# Patient Record
Sex: MALE | Race: BLACK OR AFRICAN AMERICAN | NOT HISPANIC OR LATINO | URBAN - METROPOLITAN AREA
[De-identification: names, ages, dates, MRNs, and addresses within clinical notes are randomized per-mention and may not be internally consistent; named-entity substitution may affect disease eponyms.]

---

## 2017-09-28 ENCOUNTER — OUTPATIENT (OUTPATIENT)
Dept: OUTPATIENT SERVICES | Facility: HOSPITAL | Age: 52
LOS: 1 days | Discharge: HOME | End: 2017-09-28

## 2017-09-28 DIAGNOSIS — Z01.818 ENCOUNTER FOR OTHER PREPROCEDURAL EXAMINATION: ICD-10-CM

## 2018-02-20 ENCOUNTER — OUTPATIENT (OUTPATIENT)
Dept: OUTPATIENT SERVICES | Facility: HOSPITAL | Age: 53
LOS: 1 days | Discharge: HOME | End: 2018-02-20

## 2018-02-20 VITALS
DIASTOLIC BLOOD PRESSURE: 85 MMHG | HEIGHT: 70 IN | TEMPERATURE: 98 F | HEART RATE: 60 BPM | RESPIRATION RATE: 16 BRPM | WEIGHT: 188.72 LBS | SYSTOLIC BLOOD PRESSURE: 172 MMHG | OXYGEN SATURATION: 100 %

## 2018-02-20 LAB
ALBUMIN SERPL ELPH-MCNC: 4.7 G/DL — SIGNIFICANT CHANGE UP (ref 3–5.5)
ALP SERPL-CCNC: 46 U/L — SIGNIFICANT CHANGE UP (ref 30–115)
ALT FLD-CCNC: 43 U/L — HIGH (ref 0–41)
ANION GAP SERPL CALC-SCNC: 8 MMOL/L — SIGNIFICANT CHANGE UP (ref 7–14)
APPEARANCE UR: CLEAR — SIGNIFICANT CHANGE UP
APTT BLD: 33.8 SEC — SIGNIFICANT CHANGE UP (ref 27–39.2)
AST SERPL-CCNC: 29 U/L — SIGNIFICANT CHANGE UP (ref 0–41)
BILIRUB SERPL-MCNC: 0.8 MG/DL — SIGNIFICANT CHANGE UP (ref 0.2–1.2)
BILIRUB UR-MCNC: NEGATIVE — SIGNIFICANT CHANGE UP
BLD GP AB SCN SERPL QL: SIGNIFICANT CHANGE UP
BUN SERPL-MCNC: 14 MG/DL — SIGNIFICANT CHANGE UP (ref 10–20)
CALCIUM SERPL-MCNC: 9.7 MG/DL — SIGNIFICANT CHANGE UP (ref 8.5–10.1)
CHLORIDE SERPL-SCNC: 102 MMOL/L — SIGNIFICANT CHANGE UP (ref 98–110)
CO2 SERPL-SCNC: 30 MMOL/L — SIGNIFICANT CHANGE UP (ref 17–32)
COLOR SPEC: YELLOW — SIGNIFICANT CHANGE UP
CREAT SERPL-MCNC: 1.1 MG/DL — SIGNIFICANT CHANGE UP (ref 0.7–1.5)
DIFF PNL FLD: NEGATIVE — SIGNIFICANT CHANGE UP
GLUCOSE SERPL-MCNC: 84 MG/DL — SIGNIFICANT CHANGE UP (ref 70–110)
GLUCOSE UR QL: NEGATIVE MG/DL — SIGNIFICANT CHANGE UP
HCT VFR BLD CALC: 41.8 % — LOW (ref 42–52)
HGB BLD-MCNC: 13.9 G/DL — LOW (ref 14–18)
INR BLD: 1.03 RATIO — SIGNIFICANT CHANGE UP (ref 0.65–1.3)
KETONES UR-MCNC: NEGATIVE — SIGNIFICANT CHANGE UP
LEUKOCYTE ESTERASE UR-ACNC: NEGATIVE — SIGNIFICANT CHANGE UP
MCHC RBC-ENTMCNC: 28 PG — SIGNIFICANT CHANGE UP (ref 27–31)
MCHC RBC-ENTMCNC: 33.3 G/DL — SIGNIFICANT CHANGE UP (ref 32–37)
MCV RBC AUTO: 84.3 FL — SIGNIFICANT CHANGE UP (ref 80–94)
NITRITE UR-MCNC: NEGATIVE — SIGNIFICANT CHANGE UP
NRBC # BLD: 0 /100 WBCS — SIGNIFICANT CHANGE UP (ref 0–0)
PH UR: 6.5 — SIGNIFICANT CHANGE UP (ref 5–8)
PLATELET # BLD AUTO: 226 K/UL — SIGNIFICANT CHANGE UP (ref 130–400)
POTASSIUM SERPL-MCNC: 3.9 MMOL/L — SIGNIFICANT CHANGE UP (ref 3.5–5)
POTASSIUM SERPL-SCNC: 3.9 MMOL/L — SIGNIFICANT CHANGE UP (ref 3.5–5)
PROT SERPL-MCNC: 7.7 G/DL — SIGNIFICANT CHANGE UP (ref 6–8)
PROT UR-MCNC: NEGATIVE MG/DL — SIGNIFICANT CHANGE UP
PROTHROM AB SERPL-ACNC: 11.1 SEC — SIGNIFICANT CHANGE UP (ref 9.95–12.87)
RBC # BLD: 4.96 M/UL — SIGNIFICANT CHANGE UP (ref 4.7–6.1)
RBC # FLD: 12.4 % — SIGNIFICANT CHANGE UP (ref 11.5–14.5)
SODIUM SERPL-SCNC: 140 MMOL/L — SIGNIFICANT CHANGE UP (ref 135–146)
SP GR SPEC: 1.02 — SIGNIFICANT CHANGE UP (ref 1.01–1.03)
TYPE + AB SCN PNL BLD: SIGNIFICANT CHANGE UP
UROBILINOGEN FLD QL: 0.2 MG/DL — SIGNIFICANT CHANGE UP (ref 0.2–0.2)
WBC # BLD: 5.67 K/UL — SIGNIFICANT CHANGE UP (ref 4.8–10.8)
WBC # FLD AUTO: 5.67 K/UL — SIGNIFICANT CHANGE UP (ref 4.8–10.8)

## 2018-02-20 NOTE — H&P PST ADULT - HISTORY OF PRESENT ILLNESS
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

## 2018-02-20 NOTE — H&P PST ADULT - ATTENDING COMMENTS
02-27-18 @ 07:31  MAT GARCIA  [id number]  53yMale    I have discussed the risks and benefits of lumbar discectomy with the patient including but not limited to bleeding, infection, weakness, numbness, paralysis, CSF leak requiring repair, no change or increase in pain or other symptoms, change in bowel/bladder/sexual function, need for decompression, revision, repeat or other procedure(s).  I have also discussed the possibility of the following:    Recurrent Disc herniation  Cauda Equina syndrome  Need for fusion  Adjacent segment progression requiring treatment(s)  Extended hospital/rehab/skilled nursing facility stay  Need for flat bedrest    I have also discussed the alternatives to the procedure as well as options for no treatment and/or expected courses for all.  I also discussed the role of any MD/PA first assistant and the patient assents to their participation.  All questions were answered and the patient wishes to proceed.

## 2018-02-20 NOTE — H&P PST ADULT - NSANTHOSAYNRD_GEN_A_CORE
see kristi tool/No. KRISTI screening performed.  STOP BANG Legend: 0-2 = LOW Risk; 3-4 = INTERMEDIATE Risk; 5-8 = HIGH Risk

## 2018-02-26 DIAGNOSIS — Z01.818 ENCOUNTER FOR OTHER PREPROCEDURAL EXAMINATION: ICD-10-CM

## 2018-02-27 ENCOUNTER — OUTPATIENT (OUTPATIENT)
Dept: OUTPATIENT SERVICES | Facility: HOSPITAL | Age: 53
LOS: 1 days | Discharge: HOME | End: 2018-02-27

## 2018-02-27 ENCOUNTER — RESULT REVIEW (OUTPATIENT)
Age: 53
End: 2018-02-27

## 2018-02-27 VITALS
RESPIRATION RATE: 28 BRPM | HEART RATE: 75 BPM | OXYGEN SATURATION: 95 % | DIASTOLIC BLOOD PRESSURE: 92 MMHG | SYSTOLIC BLOOD PRESSURE: 148 MMHG

## 2018-02-27 VITALS
HEIGHT: 70 IN | SYSTOLIC BLOOD PRESSURE: 139 MMHG | RESPIRATION RATE: 18 BRPM | HEART RATE: 63 BPM | TEMPERATURE: 99 F | DIASTOLIC BLOOD PRESSURE: 95 MMHG | WEIGHT: 182.98 LBS

## 2018-02-27 RX ORDER — METHOCARBAMOL 500 MG/1
1 TABLET, FILM COATED ORAL
Qty: 45 | Refills: 0 | OUTPATIENT
Start: 2018-02-27 | End: 2018-03-13

## 2018-02-27 RX ORDER — OXYCODONE AND ACETAMINOPHEN 5; 325 MG/1; MG/1
2 TABLET ORAL EVERY 6 HOURS
Qty: 0 | Refills: 0 | Status: DISCONTINUED | OUTPATIENT
Start: 2018-02-27 | End: 2018-02-28

## 2018-02-27 RX ORDER — MEPERIDINE HYDROCHLORIDE 50 MG/ML
12.5 INJECTION INTRAMUSCULAR; INTRAVENOUS; SUBCUTANEOUS
Qty: 0 | Refills: 0 | Status: DISCONTINUED | OUTPATIENT
Start: 2018-02-27 | End: 2018-02-28

## 2018-02-27 RX ORDER — ASCORBIC ACID 60 MG
1 TABLET,CHEWABLE ORAL
Qty: 0 | Refills: 0 | COMMUNITY

## 2018-02-27 RX ORDER — ONDANSETRON 8 MG/1
4 TABLET, FILM COATED ORAL ONCE
Qty: 0 | Refills: 0 | Status: DISCONTINUED | OUTPATIENT
Start: 2018-02-27 | End: 2018-02-28

## 2018-02-27 RX ORDER — MELOXICAM 15 MG/1
1 TABLET ORAL
Qty: 0 | Refills: 0 | COMMUNITY

## 2018-02-27 RX ORDER — HYDROMORPHONE HYDROCHLORIDE 2 MG/ML
0.5 INJECTION INTRAMUSCULAR; INTRAVENOUS; SUBCUTANEOUS
Qty: 0 | Refills: 0 | Status: DISCONTINUED | OUTPATIENT
Start: 2018-02-27 | End: 2018-02-28

## 2018-02-27 RX ORDER — HYDROMORPHONE HYDROCHLORIDE 2 MG/ML
1 INJECTION INTRAMUSCULAR; INTRAVENOUS; SUBCUTANEOUS
Qty: 0 | Refills: 0 | Status: DISCONTINUED | OUTPATIENT
Start: 2018-02-27 | End: 2018-02-28

## 2018-02-27 RX ORDER — GABAPENTIN 400 MG/1
1 CAPSULE ORAL
Qty: 0 | Refills: 0 | COMMUNITY

## 2018-02-27 RX ADMIN — HYDROMORPHONE HYDROCHLORIDE 1 MILLIGRAM(S): 2 INJECTION INTRAMUSCULAR; INTRAVENOUS; SUBCUTANEOUS at 20:16

## 2018-02-27 RX ADMIN — HYDROMORPHONE HYDROCHLORIDE 0.5 MILLIGRAM(S): 2 INJECTION INTRAMUSCULAR; INTRAVENOUS; SUBCUTANEOUS at 20:04

## 2018-02-27 RX ADMIN — OXYCODONE AND ACETAMINOPHEN 2 TABLET(S): 5; 325 TABLET ORAL at 20:51

## 2018-02-27 NOTE — CHART NOTE - NSCHARTNOTEFT_GEN_A_CORE
02-27-18 @ 16:23  MAT GARCIA  [id number]  53yMale    Spine:    I have discussed the risks and benefits of hemilaminectomy diskectomy with the patient including but not limited to bleeding, infection, weakness, numbness, paralysis, CSF leak requiring repair, no change or increase in pain or other symptoms, change in bowel/bladder/sexual function, need for decompression, revision, repeat or other procedure(s).  I have also discussed the possibility of the following:    Need for fusion; recurrent disk herniation, cauda equina compression  Adjacent segment progression requiring treatment(s)  Extended hospital/rehab/skilled nursing facility stay  Need for flat bedrest    I have also discussed the alternatives to the procedure as well as options for no treatment and/or expected courses for all.  I also discussed the role of any MD/PA first assistant and the patient assents to their participation.  All questions were answered and the patient wishes to proceed.

## 2018-02-27 NOTE — CHART NOTE - NSCHARTNOTEFT_GEN_A_CORE
PACU ANESTHESIA ADMISSION NOTE      Procedure:   Post op diagnosis:     ____ Intubated  TV:______       Rate: ______      FiO2: ______    ___x_ Patent Airway    __x__ Full return of protective reflexes    ____ Full recovery from anesthesia / sedation to baseline status    Vitals:  HR 79  /73  RR 12  O2sat. 98%  Temp: 36.1C      Mental Status:  ____ Awake   _____ Alert   __x___ Drowsy   _____ Sedated    Nausea/Vomiting: ____ Yes, See Post - Op Orders      _x___ No    Pain Scale (0-10): 0/10   Treatment: ____ None    __x__ See Post - Op/PCA Orders    Post - Operative Fluids:   ____ Oral   _x___ See Post - Op Orders    Plan:  Discharge to:   __x__Home       _____Floor      _____Critical Care    _____ Other:_________________    Comments: Uneventful anesthesia. Pt's condition is stable in PACU. Full report is given to PACU RN.

## 2018-02-27 NOTE — ASU DISCHARGE PLAN (ADULT/PEDIATRIC). - NOTIFY
Increased Irritability or Sluggishness/Pain not relieved by Medications/Unable to Urinate/Persistent Nausea and Vomiting/Numbness, color, or temperature change to extremity/Numbness, tingling/Excessive Diarrhea/Fever greater than 101/Inability to Tolerate Liquids or Foods/Swelling that continues/Bleeding that does not stop

## 2018-02-27 NOTE — BRIEF OPERATIVE NOTE - PROCEDURE
<<-----Click on this checkbox to enter Procedure Diskectomy (discectomy), intervertebral  02/27/2018  right L5/S1  Active  [id number]

## 2018-02-27 NOTE — ASU DISCHARGE PLAN (ADULT/PEDIATRIC). - MEDICATION SUMMARY - MEDICATIONS TO TAKE
I will START or STAY ON the medications listed below when I get home from the hospital:    meloxicam 15 mg oral tablet  -- 1 tab(s) by mouth once a day  -- Indication: For home med    oxycodone-acetaminophen 5 mg-300 mg oral tablet  -- 1 tab(s) by mouth every 6 hours, As Needed -for moderate pain MDD:4  -- Caution federal law prohibits the transfer of this drug to any person other  than the person for whom it was prescribed.  May cause drowsiness.  Alcohol may intensify this effect.  Use care when operating dangerous machinery.  This drug may impair the ability to drive or operate machinery.  Use care until you become familiar with its effects.  This prescription cannot be refilled.  This product contains acetaminophen.  Do not use  with any other product containing acetaminophen to prevent possible liver damage.  Using more of this medication than prescribed may cause serious breathing problems.    -- Indication: For post op pain    gabapentin  -- 1 by mouth 2 times a day  -- Indication: For home med    methocarbamol 750 mg oral tablet  -- 1 tab(s) by mouth every 8 hours -for muscle spasm   -- May cause drowsiness.  Alcohol may intensify this effect.  Use care when operating dangerous machinery.    -- Indication: For Muscle spasm    Multiple Vitamins oral tablet  -- 1 tab(s) by mouth once a day  -- Indication: For home med    Vitamin C 500 mg oral tablet  -- 1 tab(s) by mouth once a day  -- Indication: For home med

## 2018-03-01 DIAGNOSIS — M51.17 INTERVERTEBRAL DISC DISORDERS WITH RADICULOPATHY, LUMBOSACRAL REGION: ICD-10-CM

## 2018-03-02 LAB — SURGICAL PATHOLOGY STUDY: SIGNIFICANT CHANGE UP

## 2018-03-05 ENCOUNTER — OUTPATIENT (OUTPATIENT)
Dept: OUTPATIENT SERVICES | Facility: HOSPITAL | Age: 53
LOS: 1 days | Discharge: HOME | End: 2018-03-05

## 2018-03-05 DIAGNOSIS — Z02.1 ENCOUNTER FOR PRE-EMPLOYMENT EXAMINATION: ICD-10-CM

## 2018-06-06 ENCOUNTER — EMERGENCY (EMERGENCY)
Facility: HOSPITAL | Age: 53
LOS: 0 days | Discharge: HOME | End: 2018-06-06
Admitting: EMERGENCY MEDICINE

## 2018-06-06 VITALS
SYSTOLIC BLOOD PRESSURE: 134 MMHG | TEMPERATURE: 98 F | HEART RATE: 64 BPM | DIASTOLIC BLOOD PRESSURE: 81 MMHG | RESPIRATION RATE: 20 BRPM | OXYGEN SATURATION: 99 %

## 2018-06-06 DIAGNOSIS — Z79.899 OTHER LONG TERM (CURRENT) DRUG THERAPY: ICD-10-CM

## 2018-06-06 DIAGNOSIS — M54.42 LUMBAGO WITH SCIATICA, LEFT SIDE: ICD-10-CM

## 2018-06-06 DIAGNOSIS — M54.9 DORSALGIA, UNSPECIFIED: ICD-10-CM

## 2018-06-06 RX ORDER — METHOCARBAMOL 500 MG/1
1000 TABLET, FILM COATED ORAL ONCE
Qty: 0 | Refills: 0 | Status: COMPLETED | OUTPATIENT
Start: 2018-06-06 | End: 2018-06-06

## 2018-06-06 RX ORDER — METHOCARBAMOL 500 MG/1
1 TABLET, FILM COATED ORAL
Qty: 10 | Refills: 0 | OUTPATIENT
Start: 2018-06-06 | End: 2018-06-10

## 2018-06-06 RX ORDER — KETOROLAC TROMETHAMINE 30 MG/ML
30 SYRINGE (ML) INJECTION ONCE
Qty: 0 | Refills: 0 | Status: DISCONTINUED | OUTPATIENT
Start: 2018-06-06 | End: 2018-06-06

## 2018-06-06 RX ADMIN — Medication 30 MILLIGRAM(S): at 16:13

## 2018-06-06 RX ADMIN — Medication 30 MILLIGRAM(S): at 16:14

## 2018-06-06 RX ADMIN — METHOCARBAMOL 1000 MILLIGRAM(S): 500 TABLET, FILM COATED ORAL at 16:13

## 2018-06-06 NOTE — ED PROVIDER NOTE - NS ED ROS FT
Constitutional:  no fevers, no chills, no malaise  Eyes:  No visual changes  ENMT: No neck pain or stiffness, no nasal congestion, no ear pain, no throat pain  Cardiac:  No chest pain  Respiratory:  No cough or sob  GI:  No nausea, vomiting, diarrhea or abdominal pain.  :  No dysuria, frequency or burning.  MS:  + back pain.  Neuro:  No headache, no dizziness, no change in mental status  Skin:  No skin rash  Except as documented in the HPI,  all other systems are negative

## 2018-06-06 NOTE — ED ADULT NURSE NOTE - OBJECTIVE STATEMENT
Pt c/o Left lower back pain radiating to LLE x 1 week; Pt denies any other symptoms; able to move all extremities; denies trauma

## 2018-06-06 NOTE — ED PROVIDER NOTE - PHYSICAL EXAMINATION
CONSTITUTIONAL: Well-developed; well-nourished; in no acute distress, nontoxic appearing  SKIN: skin exam is warm and dry,  HEAD: Normocephalic; atraumatic.  EYES: PERRL, 3 mm bilateral, no nystagmus, EOM intact; conjunctiva and sclera clear.  ENT: MMM, no nasal congestion  NECK: Supple; non tender.+ full passive ROM in all directions. No JVD  RESP: No wheezes, rales or rhonchi. Good air movement bilaterally  ABD: soft; non-distended; non-tender. No Rebound, No guarding  EXT: Normal ROM. No cyanosis or edema. Dp Pulses intact.   BACK: FROM with discomfort, minimal left paraspinal tenderness, no e/e/e, no breaks in skin, + 2 periph pulses, n/v intact  NEURO: awake, alert, following commands, oriented, grossly unremarkable. No Focal deficits. GCS 15.   PSYCH: Cooperative, appropriate.

## 2018-06-06 NOTE — ED PROVIDER NOTE - OBJECTIVE STATEMENT
53 y.o. male with chronic back pain presents to the ED c/o left lower back pain that radiates down his left leg x 1 week.  Pt states pain is worse with position movements.  Pt has taken his gabapentin and meloxicam without any relief.  Pt denies any trauma, injury, fall, numbness, tingling urinary/ fecal complaints or any other complaints.

## 2019-05-04 NOTE — BRIEF OPERATIVE NOTE - POST-OP DX
Lumbar radiculopathy, right  02/27/2018    Active  Nilo Centeno no vomiting/no abdominal pain/no diarrhea

## 2019-06-14 ENCOUNTER — EMERGENCY (EMERGENCY)
Facility: HOSPITAL | Age: 54
LOS: 0 days | Discharge: HOME | End: 2019-06-14
Attending: EMERGENCY MEDICINE | Admitting: EMERGENCY MEDICINE
Payer: COMMERCIAL

## 2019-06-14 VITALS
RESPIRATION RATE: 19 BRPM | WEIGHT: 190.92 LBS | HEIGHT: 70 IN | TEMPERATURE: 97 F | DIASTOLIC BLOOD PRESSURE: 80 MMHG | HEART RATE: 69 BPM | SYSTOLIC BLOOD PRESSURE: 131 MMHG | OXYGEN SATURATION: 98 %

## 2019-06-14 DIAGNOSIS — T16.1XXA FOREIGN BODY IN RIGHT EAR, INITIAL ENCOUNTER: ICD-10-CM
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
## 2019-06-14 DIAGNOSIS — Z79.891 LONG TERM (CURRENT) USE OF OPIATE ANALGESIC: ICD-10-CM
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
## 2019-06-14 DIAGNOSIS — Z79.899 OTHER LONG TERM (CURRENT) DRUG THERAPY: ICD-10-CM
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
## 2019-06-14 PROBLEM — M54.9 DORSALGIA, UNSPECIFIED: Chronic | Status: ACTIVE | Noted: 2018-02-20

## 2019-06-14 PROCEDURE — 99282 EMERGENCY DEPT VISIT SF MDM: CPT | Mod: 25

## 2019-06-14 PROCEDURE — 69200 CLEAR OUTER EAR CANAL: CPT

## 2019-06-14 NOTE — ED PROVIDER NOTE - CHIEF COMPLAINT
The patient is a 54y Male complaining of foreign body eye. The patient is a 54y Male complaining of foreign body ear.

## 2019-06-14 NOTE — ED PROVIDER NOTE - PHYSICAL EXAMINATION
CONSTITUTIONAL: Well-developed; well-nourished; in no acute distress.   SKIN: warm, dry  HEAD: Normocephalic; atraumatic.  EYES: normal sclera and conjunctiva   ENT: R tm obstructed with cotton. L tm normal. No nasal discharge; airway clear.  NECK: Supple; non tender.  LYMPH: No acute cervical adenopathy.  NEURO: Alert, oriented, grossly unremarkable  PSYCH: Cooperative, appropriate.

## 2019-06-14 NOTE — ED PROVIDER NOTE - NSFOLLOWUPINSTRUCTIONS_ED_ALL_ED_FT
Ear Foreign Body  An ear foreign body is an object that is stuck in your ear. Objects in your ear can cause:  Pain.  Buzzing or roaring sounds.  Hearing loss.  Fluid or blood coming out of the ear.  Feeling sick to your stomach (nausea).  Throwing up (vomiting).  A feeling that your ear is full.  Do not try to remove an object that is stuck in your ear on your own. It is important to see a doctor to have the object taken out as soon as you can.    Follow these instructions at home:  Take over-the-counter and prescription medicines only as told by your doctor.  If you were prescribed an antibiotic medicine, such as pills or ear drops, take or use the antibiotic as told by your doctor. Do not stop taking or using it even if you start to feel better.  To keep from getting objects stuck in your ear:  Do not put anything into your ear. This includes cotton swabs. Talk with your doctor about how to clean your ears safely.  Keep small objects out of the reach of young children. Teach children not to put anything into their ears.  Keep all follow-up visits as told by your doctor. This is important.  Contact a doctor if you have:  A headache.  A fever.  Pain or swelling that gets worse.  Decreased hearing in your ear.  Ringing in your ear.  Get help right away if you:  Notice blood or fluid coming from your ear.  Summary  An ear foreign body is an object that is stuck in your ear.  Do not try to remove an object that is stuck in your ear on your own. See a doctor as soon as you can.  Contact your doctor right away if you notice blood or fluid coming from your ear.  This information is not intended to replace advice given to you by your health care provider. Make sure you discuss any questions you have with your health care provider

## 2019-06-14 NOTE — ED PROVIDER NOTE - OBJECTIVE STATEMENT
54 y m no pmh pw foreign body in R ear. Cleaning ear with cotton swab and got a piece of cotton stuck in his ear 2 days ago. No hearing changes, headache, ear pain. Denies fever, chills, neck pain, headache.

## 2019-06-14 NOTE — ED PROVIDER NOTE - CARE PROVIDER_API CALL
Toyin Johnston)  Otolaryngology  [address] [place] [address], [address] [place] [address]  Phone: [phone]  Fax: [phone]  Follow Up Time:

## 2019-06-14 NOTE — ED PROVIDER NOTE - NS ED ROS FT
Eyes:  No visual changes, eye pain or discharge.  ENMT:  Foreign body R ear. No hearing changes, pain, discharge or infections. No neck pain or stiffness.  MS:  No myalgia, muscle weakness, joint pain or back pain.  Neuro:  No headache or weakness.  No LOC.  Skin:  No skin rash.   Endocrine: No history of thyroid disease or diabetes.

## 2019-06-14 NOTE — ED PROVIDER NOTE - ATTENDING CONTRIBUTION TO CARE
I personally evaluated the patient. I reviewed the Resident’s or Physician Assistant’s note (as assigned above), and agree with the findings and plan except as documented in my note.    54 y m no pmh pw R ear cotton swab. Cotton swab removed by Res Deb. No complications. TM intact. Will NEGRA.

## 2020-02-24 NOTE — BRIEF OPERATIVE NOTE - COMMENTS
Refill requested for:     Lovastatin 40MG  Last filled on:     2/15/2019 #90 3 refills    Refill requested for:     Amlodipine 5MG  Last filled on:     2/15/2019 #90 3 refills    Refill requested for:     Lisinopril 40MG  Last filled on:     2/15/2019 #90 3 refills    Last office visit:    10/14/2019    Pending  3/30/2020    Medication refilled per protocol.  
stable to PACU

## 2021-08-04 NOTE — PRE-ANESTHESIA EVALUATION ADULT - TEMPERATURE IN CELSIUS (DEGREES C)
37.3 Taltz Counseling: I discussed with the patient the risks of ixekizumab including but not limited to immunosuppression, serious infections, worsening of inflammatory bowel disease and drug reactions.  The patient understands that monitoring is required including a PPD at baseline and must alert us or the primary physician if symptoms of infection or other concerning signs are noted.

## 2022-05-30 ENCOUNTER — EMERGENCY (EMERGENCY)
Facility: HOSPITAL | Age: 57
LOS: 0 days | Discharge: HOME | End: 2022-05-30
Attending: EMERGENCY MEDICINE | Admitting: EMERGENCY MEDICINE
Payer: COMMERCIAL

## 2022-05-30 VITALS
OXYGEN SATURATION: 100 % | HEART RATE: 66 BPM | DIASTOLIC BLOOD PRESSURE: 90 MMHG | RESPIRATION RATE: 17 BRPM | TEMPERATURE: 98 F | SYSTOLIC BLOOD PRESSURE: 162 MMHG | HEIGHT: 70 IN

## 2022-05-30 DIAGNOSIS — S61.210A LACERATION WITHOUT FOREIGN BODY OF RIGHT INDEX FINGER WITHOUT DAMAGE TO NAIL, INITIAL ENCOUNTER: ICD-10-CM

## 2022-05-30 DIAGNOSIS — Z23 ENCOUNTER FOR IMMUNIZATION: ICD-10-CM
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
## 2022-05-30 PROCEDURE — 99283 EMERGENCY DEPT VISIT LOW MDM: CPT | Mod: 25

## 2022-05-30 PROCEDURE — 73130 X-RAY EXAM OF HAND: CPT | Mod: 26,RT

## 2022-05-30 PROCEDURE — 12041 INTMD RPR N-HF/GENIT 2.5CM/<: CPT

## 2022-05-30 RX ORDER — TETANUS TOXOID, REDUCED DIPHTHERIA TOXOID AND ACELLULAR PERTUSSIS VACCINE, ADSORBED 5; 2.5; 8; 8; 2.5 [IU]/.5ML; [IU]/.5ML; UG/.5ML; UG/.5ML; UG/.5ML
0.5 SUSPENSION INTRAMUSCULAR ONCE
Refills: 0 | Status: COMPLETED | OUTPATIENT
Start: 2022-05-30 | End: 2022-05-30

## 2022-05-30 RX ORDER — IBUPROFEN 200 MG
600 TABLET ORAL ONCE
Refills: 0 | Status: COMPLETED | OUTPATIENT
Start: 2022-05-30 | End: 2022-05-30

## 2022-05-30 RX ADMIN — Medication 600 MILLIGRAM(S): at 08:58

## 2022-05-30 RX ADMIN — TETANUS TOXOID, REDUCED DIPHTHERIA TOXOID AND ACELLULAR PERTUSSIS VACCINE, ADSORBED 0.5 MILLILITER(S): 5; 2.5; 8; 8; 2.5 SUSPENSION INTRAMUSCULAR at 08:02

## 2022-05-30 NOTE — ED PROVIDER NOTE - CHIEF COMPLAINT
Per Feliciano Krishnan, APNP patient is cleared for surgery tomorrow based on nuclear stress test results.    The patient is a 57y Male complaining of lacerations.

## 2022-05-30 NOTE — ED PROVIDER NOTE - CARE PROVIDER_API CALL
Jonn Rocha)  Family Medicine  [address] [place] [address]  Phone: [phone]  Fax: [phone]  Follow Up Time: 1-3 Days

## 2022-05-30 NOTE — ED PROVIDER NOTE - ATTENDING CONTRIBUTION TO CARE
I personally evaluated the patient. I reviewed the Resident´s or Physician Assistant´s note (as assigned above), and agree with the findings and plan except as documented in my note.      57-year-old male presents the emergency department for right second digit injury sustained from accidental crush in a car door.  No other symptoms.    The review of systems otherwise unremarkable    GENERAL: male in no distress.   CHEST: normal work of breathing noted  CV: pulses intact  EXTR: 2 cm laceration to right second digit.  No bony deformities, no contusions.  NEURO: AAO 3 no focal deficits  SKIN: normal no pallor  PSYCH: normal mood & mentation  Impression: Finger laceration, crush injury    Plan: Imaging, wound management, tetanus update as needed, return and follow-up instructions

## 2022-05-30 NOTE — ED PROVIDER NOTE - CLINICAL SUMMARY MEDICAL DECISION MAKING FREE TEXT BOX
57-year-old male presents emergency department for right finger injury from crush and car door accidentally prior to arrival.  Had imaging, wound management, and reevaluation.  No acute emergent findings other than laceration which was repaired in the emergency department.  Will discharge with outpatient management and return and follow-up instructions

## 2022-05-30 NOTE — ED PROVIDER NOTE - NS ED ROS FT
Constitutional:  No fevers or chills.  Eyes:  No visual changes, eye pain, or discharge.  ENT:  No hearing changes. No sore throat.  Neck:  No neck pain or stiffness.  Cardiac:  No CP or edema.  Resp:  No cough or SOB. No hemoptysis.   GI:  No nausea, vomiting, diarrhea, or abdominal pain.  :  No dysuria, frequency, or hematuria.  MSK:  No myalgias or joint pain/swelling.  Neuro:  No headache, dizziness, or weakness.  Skin:  No skin rash.  (+) laceration

## 2022-05-30 NOTE — ED PROCEDURE NOTE - ATTENDING CONTRIBUTION TO CARE
I was present for and supervised the key critical aspects of the procedures performed during the care of the patient.    I personally evaluated the patient. I reviewed the Resident’s or Physician Assistant’s note (as assigned above), and agree with the findings and plan except as documented in my note.

## 2022-05-30 NOTE — ED PROVIDER NOTE - PHYSICAL EXAMINATION
PHYSICAL EXAM: I have reviewed current vital signs.  GENERAL: NAD, well-nourished; well-developed.  HEAD:  Normocephalic, atraumatic.  EYES: EOMI, PERRL, conjunctiva and sclera clear.  ENT: MMM, no erythema/exudates.  NECK: Supple, no JVD.  CHEST/LUNG: Clear to auscultation bilaterally; no wheezes, rales, or rhonchi.  HEART: Regular rate and rhythm, normal S1 and S2; no murmurs, rubs, or gallops.  ABDOMEN: Soft, nontender, nondistended.  EXTREMITIES:  2+ peripheral pulses; no clubbing, cyanosis, or edema.  pain with movement of the DIP joint.  PSYCH: Cooperative, appropriate, normal mood and affect.  NEUROLOGY: A&O x 3. Motor 5/5. Sensory intact. No focal neurological deficits. CN II - XII intact. (-) dysmetria, facial droop, pronator drift.  SKIN: Warm and dry.  2 cm laceration to the palmar surface of the distal 2nd digit.  bleeding controlled

## 2022-05-30 NOTE — ED PROVIDER NOTE - PATIENT PORTAL LINK FT
You can access the FollowMyHealth Patient Portal offered by [facility] by registering at the following website: [email or web address]. By joining [employer]’s FollowMyHealth portal, you will also be able to view your health information using other applications (apps) compatible with our system.

## 2022-05-30 NOTE — ED PROVIDER NOTE - OBJECTIVE STATEMENT
57 y male with no SIg PMh presents with right 2nd digit laceration at the tip of digit from this morning after crushing it in a car door.  unsure if tetanus UTD reports no loss of sensation 57 y male with no SIg PMh presents with right 2nd digit laceration at the tip of digit from this morning after crushing it in a car door.  unsure if tetanus UTD reports no loss of sensation or loss of motor control.  bleeding controled

## 2022-09-09 ENCOUNTER — APPOINTMENT (OUTPATIENT)
Dept: ORTHOPEDIC SURGERY | Facility: CLINIC | Age: 57
End: 2022-09-09

## 2022-09-09 PROCEDURE — 99072 ADDL SUPL MATRL&STAF TM PHE: CPT

## 2022-09-09 PROCEDURE — 99214 OFFICE O/P EST MOD 30 MIN: CPT

## 2022-09-09 NOTE — IMAGING
[de-identified] :   Left wrist:  in a palpation over mid carpal joint, tender palpation over snuffbox, tender palpation TFCC, full range of motion of fingers, pain with range of motion of wrist, neurovascularly intact, pain with ulnar deviation supination

## 2022-09-09 NOTE — HISTORY OF PRESENT ILLNESS
[de-identified] :  patient comes in for follow-up of his left wrist pain.  He still having significant pain.  We got authorization for surgery for the patient however he needed to hold off on doing surgery because he did not get paid enough through worker's compensation and needed to pay bills.  He cannot afford to be out of work.  He is still having significant pain.  He is interested in considering surgery.  He states that his job is not physical anymore.

## 2022-09-09 NOTE — ASSESSMENT
[FreeTextEntry1] :  the patient is still having significant pain in the wrist.  We had authorization for surgery.  Because the patient still has significant pain we opted to move forward with surgical intervention for wrist arthroscopy possible debridement as the patient has failed all conservative management including therapy and injections.  The patient would like to do this.  I discussed with him no lifting pushing pulling or heavy things for about 3 months postop.  It is okay if he returns to work 1 month postop as he is not doing any physical work.  I discussed with him postoperative therapy as well as postoperative care.  He would like to move forward with surgery. R/B/A of surgery discussed with the patient. Risks including but not limited to infection, nerve damage, tendon damage, pain, stiffness, recurrence, no resolution of symptoms, loss of function, limb or life. They understand and agree to surgery \par Return post op\par

## 2022-09-13 ENCOUNTER — FORM ENCOUNTER (OUTPATIENT)
Age: 57
End: 2022-09-13

## 2022-10-04 ENCOUNTER — APPOINTMENT (OUTPATIENT)
Dept: NEUROSURGERY | Facility: CLINIC | Age: 57
End: 2022-10-04

## 2022-10-04 VITALS — HEIGHT: 69 IN | WEIGHT: 300 LBS | BODY MASS INDEX: 44.43 KG/M2

## 2022-10-04 PROCEDURE — 99204 OFFICE O/P NEW MOD 45 MIN: CPT

## 2022-10-04 PROCEDURE — 99072 ADDL SUPL MATRL&STAF TM PHE: CPT

## 2022-10-04 RX ORDER — METHOCARBAMOL 750 MG/1
750 TABLET, FILM COATED ORAL
Qty: 30 | Refills: 2 | Status: ACTIVE | COMMUNITY
Start: 2022-10-04 | End: 1900-01-01

## 2022-10-04 NOTE — ASSESSMENT
[FreeTextEntry1] : MR. GARCIA is a 56 yo M who presents for a neurosurgical encounter, prior notable history of L5-S1 hemilaminectomy/discectomy which was completed in February 2018. He notes persistent chronic lumbar pain with radicular features, now more prevalent within the left lower extremity. Activity limitations and weakness within the lower extremities appreciated. \par \par Updated MR L spine w w/o contrast warranted for my review\par Patient has been referred for physical therapy. Sessions advised 2-3 times weekly in conjunction with home PT program\par \par Return to office in 6 weeks to review films and to devise a treatment plan moving forward.\par \par Bisi Van PA-C\par Colin Kemp MD
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
## 2022-12-09 ENCOUNTER — NON-APPOINTMENT (OUTPATIENT)
Age: 57
End: 2022-12-09

## 2022-12-12 ENCOUNTER — APPOINTMENT (OUTPATIENT)
Dept: ORTHOPEDIC SURGERY | Facility: AMBULATORY SURGERY CENTER | Age: 57
End: 2022-12-12

## 2022-12-12 PROCEDURE — 29846 WRIST ARTHROSCOPY/SURGERY: CPT | Mod: LT

## 2022-12-12 RX ORDER — OXYCODONE AND ACETAMINOPHEN 5; 325 MG/1; MG/1
5-325 TABLET ORAL
Qty: 15 | Refills: 0 | Status: ACTIVE | COMMUNITY
Start: 2022-12-12 | End: 1900-01-01

## 2022-12-20 ENCOUNTER — APPOINTMENT (OUTPATIENT)
Dept: NEUROSURGERY | Facility: CLINIC | Age: 57
End: 2022-12-20

## 2022-12-20 DIAGNOSIS — M51.26 OTHER INTERVERTEBRAL DISC DISPLACEMENT, LUMBAR REGION: ICD-10-CM

## 2022-12-20 DIAGNOSIS — M51.16 INTERVERTEBRAL DISC DISORDERS WITH RADICULOPATHY, LUMBAR REGION: ICD-10-CM

## 2022-12-20 PROCEDURE — 99214 OFFICE O/P EST MOD 30 MIN: CPT

## 2022-12-20 PROCEDURE — 99072 ADDL SUPL MATRL&STAF TM PHE: CPT

## 2022-12-20 NOTE — DISCUSSION/SUMMARY
[de-identified] : Patient has lower back pain with radicular symptoms into the right lower extremity L5-S1 distribution, possibly related to recurrent L5-S1 herniated disc. At this time patient describes pain as manageable. He is going away for a few months. When he returns he is advised to follow with injection therapy treatment by pain management, he will call for consult when he is back in March 2023. Patient is uninterested in neurosurgical intervention at this time. \par \par EARLINE, Rohit Davis, attest that this documentation has been prepared under the direction and in the presence of Provider Colin Kemp MD\par  \par Thank you for allowing me to assist in the care of this patient. \par  \par Colin Kemp MD\par \par Board Certified , Neurosurgeon\par \par  No
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
## 2022-12-23 ENCOUNTER — APPOINTMENT (OUTPATIENT)
Dept: ORTHOPEDIC SURGERY | Facility: CLINIC | Age: 57
End: 2022-12-23
Payer: OTHER MISCELLANEOUS

## 2022-12-23 ENCOUNTER — NON-APPOINTMENT (OUTPATIENT)
Age: 57
End: 2022-12-23

## 2022-12-23 PROCEDURE — 99072 ADDL SUPL MATRL&STAF TM PHE: CPT

## 2022-12-23 PROCEDURE — 99024 POSTOP FOLLOW-UP VISIT: CPT

## 2022-12-23 PROCEDURE — L3908: CPT | Mod: LT

## 2022-12-23 NOTE — ASSESSMENT
[FreeTextEntry1] : Patient comes in status post left wrist arthroscopy.  He is doing relatively well.  He does not complain of much pain.  He is here for his 1st postop visit.\par \par   Left upper extremity:  Incision sites clean dry intact, minimal swelling, full range of motion of fingers, neurovascularly intact\par \par  status post left wrist arthroscopy.  Sutures removed.  He is going to start with scar massage.  He is given a cock-up wrist splint.  He is going to start with therapy hopefully in a couple weeks or so.  We have authorization.  I will follow up with me in a month.

## 2023-02-03 ENCOUNTER — NON-APPOINTMENT (OUTPATIENT)
Age: 58
End: 2023-02-03

## 2023-02-03 ENCOUNTER — APPOINTMENT (OUTPATIENT)
Dept: ORTHOPEDIC SURGERY | Facility: CLINIC | Age: 58
End: 2023-02-03
Payer: OTHER MISCELLANEOUS

## 2023-02-03 PROCEDURE — 99024 POSTOP FOLLOW-UP VISIT: CPT

## 2023-02-03 NOTE — ASSESSMENT
[FreeTextEntry1] : Patient comes in status post left wrist arthroscopy.  He is doing relatively well.  He does not complain of much pain. He has been going to therapy. He will return to work this week.\par \par   Left upper extremity:  Incision sites Well healed,, full range of motion of fingers,  good range of motion of wrist, neurovascularly intact\par \par  status post left wrist arthroscopy.  Patient is doing great. He will let pain be his guide for returning to work. He can return full duty. He will follow up in six weeks

## 2023-02-03 NOTE — WORK
[Does not reveal pre-existing condition(s) that may affect treatment/prognosis] : does not reveal pre-existing condition(s) that may affect treatment/prognosis [I provided the services listed above] :  I provided the services listed above. [Mild Partial] : mild partial [Can return to work without limitations on ______] : can return to work without limitations on [unfilled] [FreeTextEntry1] : good [FreeTextEntry3] : lg

## 2023-03-22 ENCOUNTER — APPOINTMENT (OUTPATIENT)
Dept: ORTHOPEDIC SURGERY | Facility: CLINIC | Age: 58
End: 2023-03-22
Payer: OTHER MISCELLANEOUS

## 2023-03-22 PROCEDURE — 99212 OFFICE O/P EST SF 10 MIN: CPT

## 2023-03-22 NOTE — ASSESSMENT
[FreeTextEntry1] : Patient comes in status post left wrist arthroscopy.  He is doing relatively well.  He does not complain of much pain.  He says he has some pain on the radial aspect of the wrist but it is a lot better than prior.  He has been going to therapy.  He is not sure have any sessions he has left.  He returned to work.\par \par   Left upper extremity:  Incision sites Well healed,, full range of motion of fingers, slightly tender to palpation over radiocarpal joint, good range of motion of wrist, neurovascularly intact\par \par  status post left wrist arthroscopy.  Patient is doing great.  The patient has already returned to work.  He is going to continue with therapy.  If he only has a few sessions left he will need more.  He will return in about 6 weeks

## 2023-03-22 NOTE — WORK
[Mild Partial] : mild partial [Does not reveal pre-existing condition(s) that may affect treatment/prognosis] : does not reveal pre-existing condition(s) that may affect treatment/prognosis [Can return to work without limitations on ______] : can return to work without limitations on [unfilled] [I provided the services listed above] :  I provided the services listed above. [FreeTextEntry1] : good [FreeTextEntry3] : lg

## 2023-03-28 ENCOUNTER — RX RENEWAL (OUTPATIENT)
Age: 58
End: 2023-03-28

## 2023-03-29 RX ORDER — GABAPENTIN 300 MG/1
300 CAPSULE ORAL
Qty: 60 | Refills: 2 | Status: ACTIVE | COMMUNITY
Start: 2022-10-04 | End: 1900-01-01

## 2023-04-26 ENCOUNTER — FORM ENCOUNTER (OUTPATIENT)
Age: 58
End: 2023-04-26

## 2023-04-26 ENCOUNTER — APPOINTMENT (OUTPATIENT)
Dept: ORTHOPEDIC SURGERY | Facility: CLINIC | Age: 58
End: 2023-04-26
Payer: OTHER MISCELLANEOUS

## 2023-04-26 DIAGNOSIS — M25.532 PAIN IN LEFT WRIST: ICD-10-CM

## 2023-04-26 PROCEDURE — 99243 OFF/OP CNSLTJ NEW/EST LOW 30: CPT

## 2023-04-26 PROCEDURE — 99213 OFFICE O/P EST LOW 20 MIN: CPT

## 2023-04-26 NOTE — WORK
[Has the patient reached Maximum Medical Improvement? If yes, indicate date___] : Yes, on [unfilled] [Left] : left [FreeTextEntry7] : wrist [FreeTextEntry5] : 30

## 2023-04-26 NOTE — ASSESSMENT
[FreeTextEntry1] : Patient comes in for schedule loss of use of the left wrist.  He is doing relatively well.  He is back to work.  The patient is right-hand dominant\par \par left wrist:\par 50 degrees flexion \par 60 degrees extension \par 30 degrees ulnar deviation\par 10 degrees radial deviation\par full supination\par 70 wrist pronation \par dynanometer: 60 lbs of force, 50 lbs of force x 2 \par \par \par right wrist: \par 90 degrees of flexion\par 80 degrees of extension \par 40 degrees ulnar deviation \par 20 degrees of radial deviation \par full supination\par full pronation \par dynanometeR: 100 lbs of force x3\par \par The patient should have more strength than he already does on the left side.  He should be two thirds of his dominant side.

## 2023-05-03 ENCOUNTER — APPOINTMENT (OUTPATIENT)
Dept: ORTHOPEDIC SURGERY | Facility: CLINIC | Age: 58
End: 2023-05-03

## 2023-08-09 NOTE — ED PROVIDER NOTE - NS ED MD DISPO DISCHARGE
Health Maintenance Due   Topic Date Due   • Hepatitis B Vaccine (1 of 3 - 3-dose series) Never done   • Pneumococcal Vaccine 0-64 (1 - PCV) Never done       Patient is due for topics as listed above but is not proceeding with Immunization(s) Hep B and Pneumococcal at this time.    Home

## 2023-09-06 NOTE — ED PROVIDER NOTE - NSFOLLOWUPINSTRUCTIONS_ED_ALL_ED_FT
Laceration    A laceration is a cut that goes through all of the layers of the skin and into the tissue that is right under the skin. Some lacerations heal on their own. Others need to be closed with stitches (sutures), staples, skin adhesive strips, or skin glue. Proper laceration care minimizes the risk of infection and helps the laceration to heal better.     SEEK IMMEDIATE MEDICAL CARE IF YOU HAVE THE FOLLOWING SYMPTOMS: swelling around the wound, worsening pain, drainage from the wound, red streaking going away from your wound, inability to move finger or toe near the laceration, or discoloration of skin near the laceration. Rupture of Membranes

## 2023-10-24 ENCOUNTER — APPOINTMENT (OUTPATIENT)
Dept: NEUROSURGERY | Facility: CLINIC | Age: 58
End: 2023-10-24
Payer: OTHER MISCELLANEOUS

## 2023-10-24 DIAGNOSIS — Z00.00 ENCOUNTER FOR GENERAL ADULT MEDICAL EXAMINATION W/OUT ABNORMAL FINDINGS: ICD-10-CM

## 2023-10-24 DIAGNOSIS — M54.16 RADICULOPATHY, LUMBAR REGION: ICD-10-CM

## 2023-10-24 PROCEDURE — 99214 OFFICE O/P EST MOD 30 MIN: CPT

## 2024-08-02 NOTE — H&P PST ADULT - FALL HARM RISK
Caller: Talita from [facility]    Provider: MD Lars Torres    Detailed reason for call: Talita is calling after seeing Elizabeth and wanted to update us that per pt she is taking gabapentin 100 mg twice a day not three times a day    Best phone number to contact: [phone]    Best time to contact: any    Ok to leave a detailed message: Yes    Ok to speak to authorized person if needed: No      (Noted to patient if reason is related to wound or incision, to please send a photo via email or [employer].)   
other/no risk to fall presently/goes to physical therapy

## 2025-06-30 ENCOUNTER — APPOINTMENT (OUTPATIENT)
Dept: ORTHOPEDIC SURGERY | Facility: CLINIC | Age: 60
End: 2025-06-30
Payer: OTHER MISCELLANEOUS

## 2025-06-30 PROCEDURE — 73562 X-RAY EXAM OF KNEE 3: CPT | Mod: LT

## 2025-06-30 PROCEDURE — 99203 OFFICE O/P NEW LOW 30 MIN: CPT
